# Patient Record
Sex: FEMALE | Race: WHITE | Employment: OTHER | URBAN - METROPOLITAN AREA
[De-identification: names, ages, dates, MRNs, and addresses within clinical notes are randomized per-mention and may not be internally consistent; named-entity substitution may affect disease eponyms.]

---

## 2017-03-29 PROBLEM — E07.9 THYROID DISEASE: Status: ACTIVE | Noted: 2017-03-29

## 2017-03-29 PROBLEM — E78.5 HYPERLIPIDEMIA: Status: ACTIVE | Noted: 2017-03-29

## 2017-03-29 PROBLEM — I10 ESSENTIAL HYPERTENSION: Status: ACTIVE | Noted: 2017-03-29

## 2018-10-19 ENCOUNTER — TELEPHONE (OUTPATIENT)
Dept: ENT CLINIC | Age: 63
End: 2018-10-19

## 2020-08-17 ENCOUNTER — OFFICE VISIT (OUTPATIENT)
Dept: ORTHOPEDIC SURGERY | Age: 65
End: 2020-08-17
Payer: MEDICARE

## 2020-08-17 VITALS — TEMPERATURE: 98 F | HEIGHT: 68 IN | WEIGHT: 236 LBS | BODY MASS INDEX: 35.77 KG/M2

## 2020-08-17 PROCEDURE — 20600 DRAIN/INJ JOINT/BURSA W/O US: CPT | Performed by: ORTHOPAEDIC SURGERY

## 2020-08-17 PROCEDURE — 99203 OFFICE O/P NEW LOW 30 MIN: CPT | Performed by: ORTHOPAEDIC SURGERY

## 2020-08-17 RX ORDER — ATORVASTATIN CALCIUM 20 MG/1
TABLET, FILM COATED ORAL
COMMUNITY
Start: 2020-07-14

## 2020-08-17 NOTE — PROGRESS NOTES
Kris Menon is a 72 y.o. female, who presents   Chief Complaint   Patient presents with    Finger Pain     Right Hand, thumb, states of pain when moving joint. Previous injection 4 years ago. HPI[de-identified] Right thumb pain is been present for quite some time. She said episodes in the past which we treated with injections. She knows she has arthritis in the hand. She is not ready for any surgical procedure at this time and would like it injected once again to gain the comfort and use associated. Allergies; medications; past medical, surgical, family, and social history; and problem list have been reviewed today and updated as indicated in this encounter - see below following Ortho specifics. Musculoskeletal: Skin condition and gross neurovascular function are good in her right upper extremity. Shoulder elbow and wrist motion are good. There is tenderness palpation about the thumb carpometacarpal joint. There is slight abduction of the thumb metacarpal with mild contracture of the web. She is pliable and reducible. There is crepitus and subluxation at the [facility] joint and pain associated. MP and IP joint motion are good in the thumb. Radiologic Studies: Imaging studies showed significant degenerative changes in the thumb carpometacarpal joint right hand. She also has triscaphe arthrosis with joint narrowing. General alignment is still good. ASSESSMENT:  George Hardin was seen today for finger pain. Diagnoses and all orders for this visit:    Chronic thumb pain, right  -     XR HAND RIGHT (MIN 3 VIEWS); Future    Arthritis of carpometacarpal (CMC) joint of right thumb    Arthritis of kbzkowpx-zcsfzvbcm-cbgihydth joint of right hand     Treatment alternatives were reviewed including medical and physical therapies, injections, and surgical options, expected risks benefits and likely outcome of each were discussed in detail, questions asked and answered and understood.   We discussed the pathology and treatment options. She would like injection once again. The risks and potential benefits discussed and understood. PLAN:  injection of the right thumb carpometacarpal joint with Kenalog   (triamcinalone)         10mg and 1/2 cc 1/4 % bupivicainewas discussed with the patient. Discussion included but was not limited to potential risks and benefits. No contraindications to the procedure were noted. Understanding and agreement was indicated. The procedure was accomplished without incident using appropriate aseptic technique. follow up as needed        Patient Active Problem List   Diagnosis    Degenerative arthritis of right knee    Medial meniscus tear R    Synovitis    Hyperlipidemia    Thyroid disease    Essential hypertension       Past Medical History:   Diagnosis Date    Arthritis     osteoporosis    Hyperlipidemia     normal now not on meds    Thoracic outlet syndrome     Thyroid disease        Past Surgical History:   Procedure Laterality Date    CARDIAC SURGERY  2000    heart [facility] states was normal     COSMETIC SURGERY      KNEE ARTHROSCOPY      RIGHT    KNEE ARTHROSCOPY Right 06 05 2014    synovectomy, chondroplasty, medial menisectomy    TMJ ARTHROSCOPY      BILATERAL    TONSILLECTOMY      TOTAL KNEE ARTHROPLASTY Right 03/28/2017    Dr. Sameer Maldonado EXTRACTION         Current Outpatient Medications   Medication Sig Dispense Refill    atorvastatin (LIPITOR) 20 MG tablet take 1 tablet by mouth at bedtime      Cholecalciferol (VITAMIN D3) 2000 UNITS CAPS Take 1 capsule by mouth daily      TURMERIC PO Take 1,000 mg by mouth daily      levothyroxine (SYNTHROID) 125 MCG tablet Take 150 mcg by mouth Daily        No current facility-administered medications for this visit.         Allergies   Allergen Reactions    Bee Venom Hives    Motrin [Ibuprofen] Hives       Social History     Socioeconomic History    wheezes. Neurological: Alert and oriented to person  Skin: Skin is warm and dry. Maninder Dowling, DO    8/17/20  9:30 AM    All reasonable efforts have been made to minimize the risk of errors that may occur in the use of voice recognition and other electronic means of charting.

## 2020-11-23 ENCOUNTER — OFFICE VISIT (OUTPATIENT)
Dept: ORTHOPEDIC SURGERY | Age: 65
End: 2020-11-23
Payer: MEDICARE

## 2020-11-23 VITALS — BODY MASS INDEX: 35.61 KG/M2 | WEIGHT: 235 LBS | HEIGHT: 68 IN | TEMPERATURE: 98 F

## 2020-11-23 PROCEDURE — 20600 DRAIN/INJ JOINT/BURSA W/O US: CPT | Performed by: ORTHOPAEDIC SURGERY

## 2020-11-23 PROCEDURE — 99213 OFFICE O/P EST LOW 20 MIN: CPT | Performed by: ORTHOPAEDIC SURGERY

## 2020-11-23 NOTE — PROGRESS NOTES
Chief Complaint:   Chief Complaint   Patient presents with    Hand Pain     f/u right thumb pain. Patient was given a CSI on 8/17/2020 that gave her about 3 months of relief. 3 wks ago the pain returned. Isabelle Gleason complains of pain in the right thumb base again. She did fairly well for several months after the injection of the thumb carpometacarpal joint. Is beginning to bother her again. She is not interested in aggressive treatment such as surgery at this time. She is aware that surgery is possible for this. She is requesting another injection to decrease her discomfort for a period of time. Allergies; medications; past medical, surgical, family, and social history; and problem list have been reviewed today and updated as indicated in this encounter seen below. Exam: Skin condition gross neurovascular function are good in right upper extremity. There is no redness or swelling around the base of the thumb. There is slight subluxation of the thumb carpometacarpal joint some crepitus with range of motion. Her finger range of motion overall is pretty good. There is only slight flexion contracture of the thumb metacarpal.  Overall wrist range of motion is good and elbow and shoulder motion are good without pain. Radiographs: Previous imaging shows degeneration through the triscaphe and thumb carpometacarpal joints. ASSESSMENT:    Sana Genao was seen today for hand pain. Diagnoses and all orders for this visit:    Arthritis of carpometacarpal [place]) joint of right thumb    Arthritis of unvwzhif-agafrsqtm-oepxrzdxq joint of right hand        PLAN:  injection of the right thumb carpometacarpal joint with Kenalog   (triamcinalone)         10mg and 1/2 cc 1/4 % bupivicainewas discussed with the patient. Discussion included but was not limited to potential risks and benefits. No contraindications to the procedure were noted. Understanding and agreement was indicated.   The procedure was accomplished without incident using appropriate aseptic technique. follow up as needed    Return if symptoms worsen or fail to improve. Current Outpatient Medications   Medication Sig Dispense Refill    atorvastatin (LIPITOR) 20 MG tablet take 1 tablet by mouth at bedtime      Cholecalciferol (VITAMIN D3) 2000 UNITS CAPS Take 1 capsule by mouth daily      TURMERIC PO Take 1,000 mg by mouth daily      levothyroxine (SYNTHROID) 125 MCG tablet Take 150 mcg by mouth Daily        No current facility-administered medications for this visit.         Patient Active Problem List   Diagnosis    Degenerative arthritis of right knee    Medial meniscus tear R    Synovitis    Hyperlipidemia    Thyroid disease    Essential hypertension       Past Medical History:   Diagnosis Date    Arthritis     osteoporosis    Hyperlipidemia     normal now not on meds    Thoracic outlet syndrome     Thyroid disease        Past Surgical History:   Procedure Laterality Date    CARDIAC SURGERY  2000    heart [facility] states was normal     COSMETIC SURGERY      KNEE ARTHROSCOPY      RIGHT    KNEE ARTHROSCOPY Right 06 05 2014    synovectomy, chondroplasty, medial menisectomy    TMJ ARTHROSCOPY      BILATERAL    TONSILLECTOMY      TOTAL KNEE ARTHROPLASTY Right 03/28/2017    Dr. Radha Woodward EXTRACTION         Allergies   Allergen Reactions    Bee Venom Hives    Motrin [Ibuprofen] Hives       Social History     Socioeconomic History    Marital status:      Spouse name: None    Number of children: None    Years of education: None    Highest education level: None   Occupational History    None   Social Needs    Financial resource strain: None    Food insecurity     Worry: None     Inability: None    Transportation needs     Medical: None     Non-medical: None   Tobacco Use    Smoking status: Former Smoker    Smokeless tobacco: Never Used    Tobacco comment: quit as teen   Substance and Sexual Activity    Alcohol use: Yes     Alcohol/week: 1.0 standard drinks     Types: 1 Glasses of wine per week     Comment: social    Drug use: No    Sexual activity: None   Lifestyle    Physical activity     Days per week: None     Minutes per session: None    Stress: None   Relationships    Social connections     Talks on phone: None     Gets together: None     Attends Judaism service: None     Active member of club or organization: None     Attends meetings of clubs or organizations: None     Relationship status: None    Intimate partner violence     Fear of current or ex partner: None     Emotionally abused: None     Physically abused: None     Forced sexual activity: None   Other Topics Concern    None   Social History Narrative    None       Review of Systems  As follows except as previously noted in HPI:  Constitutional: Negative for chills, diaphoresis, fatigue, fever and unexpected weight change. Respiratory: Negative for cough, shortness of breath and wheezing. Cardiovascular: Negative for chest pain and palpitations. Neurological: Negative for dizziness, syncope, cephalgia. GI / : negative  Musculoskeletal: see HPI       Objective:   Physical Exam   Constitutional: Oriented to person, place, and time. and appears well-developed and well-nourished. :   Head: Normocephalic and atraumatic. Eyes: EOM are normal.   Neck: Neck supple. Cardiovascular: Normal rate and regular rhythm. Pulmonary/Chest: Effort normal. No stridor. No respiratory distress, no wheezes. Abdominal:  No abnormal distension. Neurological: Alert and oriented to person, place, and time. Skin: Skin is warm and dry. Psychiatric: Normal mood and affect.  Behavior is normal. Thought content normal.    RAQUEL Chavez DO    11/23/20  12:07 PM

## 2021-10-25 ENCOUNTER — OFFICE VISIT (OUTPATIENT)
Dept: ORTHOPEDIC SURGERY | Age: 66
End: 2021-10-25
Payer: MEDICARE

## 2021-10-25 VITALS — HEIGHT: 68 IN | TEMPERATURE: 98 F | BODY MASS INDEX: 36.37 KG/M2 | WEIGHT: 240 LBS

## 2021-10-25 DIAGNOSIS — M18.11 ARTHRITIS OF CARPOMETACARPAL (CMC) JOINT OF RIGHT THUMB: ICD-10-CM

## 2021-10-25 DIAGNOSIS — M19.031 ARTHRITIS OF SCAPHOID-TRAPEZIUM-TRAPEZOID JOINT OF RIGHT HAND: Primary | ICD-10-CM

## 2021-10-25 PROCEDURE — 99214 OFFICE O/P EST MOD 30 MIN: CPT | Performed by: ORTHOPAEDIC SURGERY

## 2021-10-25 NOTE — PROGRESS NOTES
Chief Complaint:   Chief Complaint   Patient presents with    Follow-up     Right thumb. Hx of CMC DJD. CSI DOS 11/23/20 with no relief. patient is right hand dominant. Anjana Gleason follows with problems with her right hand. She has pain at the base of her thumb and has had for some time. We did of giving her injections of corticosteroids in the past to alleviate the pain. She is reached the point she wants to get some definitive done. This interferes with her desired activities a good bit and she realizes symptomatic treatment does not meet her needs anymore. Allergies; medications; past medical, surgical, family, and social history; and problem list have been reviewed today and updated as indicated in this encounter seen below. Exam: Skin condition gross neurovascular function is good in right upper extremity. Shoulder elbow and wrist motion are good. Hand motion is little limited particularly in the thumb area. There is discomfort at the base of the thumb. There are some crepitus and she occasionally get swelling. This does limit her activities particularly with regards to strength and endurance. There is tenderness palpation and around the scaphoid and base of the thumb metacarpal.    Radiographs: Previous imaging of the right hand shows degenerative changes of the metacarpotrapezial and also the triscaphe joints. There is not much malalignment on these images. ASSESSMENT:    Rick Shearer was seen today for follow-up. Diagnoses and all orders for this visit:    Arthritis of ytbnxkdi-aaperkydh-xevetafcc joint of right hand    Arthritis of carpometacarpal (CMC) joint of right thumb        PLAN: We discussed the problems in the adjacent joints. Treatment options and expectations were reviewed in detail with the discussion of the various surgical options, risk prognosis limitations and rehab. Good understanding was indicated.   I will review the options again and tailor surgical treatment to Elizabeth's circumstances and needs. No follow-ups on file. Current Outpatient Medications   Medication Sig Dispense Refill    atorvastatin (LIPITOR) 20 MG tablet take 1 tablet by mouth at bedtime      Cholecalciferol (VITAMIN D3) 2000 UNITS CAPS Take 1 capsule by mouth daily      TURMERIC PO Take 1,000 mg by mouth daily      levothyroxine (SYNTHROID) 125 MCG tablet Take 150 mcg by mouth Daily        No current facility-administered medications for this visit. Patient Active Problem List   Diagnosis    Degenerative arthritis of right knee    Medial meniscus tear R    Synovitis    Hyperlipidemia    Thyroid disease    Essential hypertension       Past Medical History:   Diagnosis Date    Arthritis     osteoporosis    Hyperlipidemia     normal now not on meds    Thoracic outlet syndrome     Thyroid disease        Past Surgical History:   Procedure Laterality Date    CARDIAC SURGERY  2000    heart [facility] states was normal     COSMETIC SURGERY      KNEE ARTHROSCOPY      RIGHT    KNEE ARTHROSCOPY Right 06 05 2014    synovectomy, chondroplasty, medial menisectomy    TMJ ARTHROSCOPY      BILATERAL    TONSILLECTOMY      TOTAL KNEE ARTHROPLASTY Right 03/28/2017    Dr. Alyce Jerez EXTRACTION         Allergies   Allergen Reactions    Bee Venom Hives    Motrin [Ibuprofen] Hives       Social History     Socioeconomic History    Marital status:      Spouse name: None    Number of children: None    Years of education: None    Highest education level: None   Occupational History    None   Tobacco Use    Smoking status: Former Smoker    Smokeless tobacco: Never Used    Tobacco comment: quit as teen   Substance and Sexual Activity    Alcohol use:  Yes     Alcohol/week: 1.0 standard drinks     Types: 1 Glasses of wine per week     Comment: social    Drug use: No    Sexual activity: None   Other Topics Concern    None   Social History Narrative    None     Social Determinants of Health     Financial Resource Strain:     Difficulty of Paying Living Expenses:    Food Insecurity:     Worried About Running Out of Food in the Last Year:     [address] in the Last Year:    Transportation Needs:     Lack of Transportation (Medical):  Lack of Transportation (Non-Medical):    Physical Activity:     Days of Exercise per Week:     Minutes of Exercise per Session:    Stress:     Feeling of Stress :    Social Connections:     Frequency of Communication with Friends and Family:     Frequency of Social Gatherings with Friends and Family:     Attends Voodoo Services:     Active Member of Clubs or Organizations:     Attends Club or Organization Meetings:     Marital Status:    Intimate Partner Violence:     Fear of Current or Ex-Partner:     Emotionally Abused:     Physically Abused:     Sexually Abused:        Review of Systems  As follows except as previously noted in HPI:  Constitutional: Negative for chills, diaphoresis, fatigue, fever and unexpected weight change. Respiratory: Negative for cough, shortness of breath and wheezing. Cardiovascular: Negative for chest pain and palpitations. Neurological: Negative for dizziness, syncope, cephalgia. GI / : negative  Musculoskeletal: see HPI       Objective:   Physical Exam   Constitutional: Oriented to person, place, and time. and appears well-developed and well-nourished. :   Head: Normocephalic and atraumatic. Eyes: EOM are normal.   Neck: Neck supple. Cardiovascular: Normal rate and regular rhythm. Pulmonary/Chest: Effort normal. No stridor. No respiratory distress, no wheezes. Abdominal:  No abnormal distension. Neurological: Alert and oriented to person, place, and time. Skin: Skin is warm and dry. Psychiatric: Normal mood and affect.  Behavior is normal. Thought content normal.    K Teressa Heimlich, DO    10/25/21  12:02 PM

## 2021-11-12 ENCOUNTER — HOSPITAL ENCOUNTER (OUTPATIENT)
Age: 66
Discharge: HOME | End: 2021-11-14
Payer: MEDICARE

## 2021-11-12 DIAGNOSIS — M18.11 ARTHRITIS OF CARPOMETACARPAL (CMC) JOINT OF RIGHT THUMB: ICD-10-CM

## 2021-11-12 PROCEDURE — U0003 INFECTIOUS AGENT DETECTION BY NUCLEIC ACID (DNA OR RNA); SEVERE ACUTE RESPIRATORY SYNDROME CORONAVIRUS 2 (SARS-COV-2) (CORONAVIRUS DISEASE [COVID-19]), AMPLIFIED PROBE TECHNIQUE, MAKING USE OF HIGH THROUGHPUT TECHNOLOGIES AS DESCRIBED BY CMS-2020-01-R: HCPCS

## 2021-11-12 PROCEDURE — U0005 INFEC AGEN DETEC AMPLI PROBE: HCPCS

## 2021-11-13 LAB — SARS-COV-2, PCR: DETECTED
